# Patient Record
Sex: FEMALE | Race: WHITE | NOT HISPANIC OR LATINO | Employment: FULL TIME | URBAN - METROPOLITAN AREA
[De-identification: names, ages, dates, MRNs, and addresses within clinical notes are randomized per-mention and may not be internally consistent; named-entity substitution may affect disease eponyms.]

---

## 2019-12-26 ENCOUNTER — APPOINTMENT (OUTPATIENT)
Dept: RADIOLOGY | Facility: MEDICAL CENTER | Age: 19
End: 2019-12-26
Attending: EMERGENCY MEDICINE
Payer: COMMERCIAL

## 2019-12-26 ENCOUNTER — HOSPITAL ENCOUNTER (EMERGENCY)
Facility: MEDICAL CENTER | Age: 19
End: 2019-12-26
Attending: EMERGENCY MEDICINE
Payer: COMMERCIAL

## 2019-12-26 VITALS
HEIGHT: 64 IN | BODY MASS INDEX: 18.82 KG/M2 | DIASTOLIC BLOOD PRESSURE: 49 MMHG | TEMPERATURE: 98.2 F | WEIGHT: 110.23 LBS | RESPIRATION RATE: 14 BRPM | HEART RATE: 95 BPM | SYSTOLIC BLOOD PRESSURE: 92 MMHG | OXYGEN SATURATION: 98 %

## 2019-12-26 DIAGNOSIS — N39.0 ACUTE UTI: ICD-10-CM

## 2019-12-26 DIAGNOSIS — O21.0 MORNING SICKNESS: ICD-10-CM

## 2019-12-26 LAB
ALBUMIN SERPL BCP-MCNC: 4.3 G/DL (ref 3.2–4.9)
ALBUMIN/GLOB SERPL: 1.5 G/DL
ALP SERPL-CCNC: 49 U/L (ref 30–99)
ALT SERPL-CCNC: 14 U/L (ref 2–50)
ANION GAP SERPL CALC-SCNC: 11 MMOL/L (ref 0–11.9)
APPEARANCE UR: ABNORMAL
AST SERPL-CCNC: 16 U/L (ref 12–45)
B-HCG SERPL-ACNC: ABNORMAL MIU/ML (ref 0–5)
BACTERIA #/AREA URNS HPF: ABNORMAL /HPF
BASOPHILS # BLD AUTO: 0.3 % (ref 0–1.8)
BASOPHILS # BLD: 0.02 K/UL (ref 0–0.12)
BILIRUB SERPL-MCNC: 0.4 MG/DL (ref 0.1–1.5)
BILIRUB UR QL STRIP.AUTO: NEGATIVE
BUN SERPL-MCNC: 12 MG/DL (ref 8–22)
CALCIUM SERPL-MCNC: 9.5 MG/DL (ref 8.5–10.5)
CHLORIDE SERPL-SCNC: 105 MMOL/L (ref 96–112)
CO2 SERPL-SCNC: 20 MMOL/L (ref 20–33)
COLOR UR: ABNORMAL
CREAT SERPL-MCNC: 0.6 MG/DL (ref 0.5–1.4)
EKG IMPRESSION: NORMAL
EOSINOPHIL # BLD AUTO: 0.01 K/UL (ref 0–0.51)
EOSINOPHIL NFR BLD: 0.1 % (ref 0–6.9)
EPI CELLS #/AREA URNS HPF: ABNORMAL /HPF
ERYTHROCYTE [DISTWIDTH] IN BLOOD BY AUTOMATED COUNT: 38.3 FL (ref 35.9–50)
GLOBULIN SER CALC-MCNC: 2.8 G/DL (ref 1.9–3.5)
GLUCOSE SERPL-MCNC: 79 MG/DL (ref 65–99)
GLUCOSE UR STRIP.AUTO-MCNC: NEGATIVE MG/DL
HCT VFR BLD AUTO: 40.7 % (ref 37–47)
HGB BLD-MCNC: 14.1 G/DL (ref 12–16)
IMM GRANULOCYTES # BLD AUTO: 0.03 K/UL (ref 0–0.11)
IMM GRANULOCYTES NFR BLD AUTO: 0.4 % (ref 0–0.9)
KETONES UR STRIP.AUTO-MCNC: >=160 MG/DL
LEUKOCYTE ESTERASE UR QL STRIP.AUTO: NEGATIVE
LIPASE SERPL-CCNC: 28 U/L (ref 11–82)
LYMPHOCYTES # BLD AUTO: 1.1 K/UL (ref 1–4.8)
LYMPHOCYTES NFR BLD: 15.2 % (ref 22–41)
MCH RBC QN AUTO: 31.2 PG (ref 27–33)
MCHC RBC AUTO-ENTMCNC: 34.6 G/DL (ref 33.6–35)
MCV RBC AUTO: 90 FL (ref 81.4–97.8)
MICRO URNS: ABNORMAL
MONOCYTES # BLD AUTO: 0.37 K/UL (ref 0–0.85)
MONOCYTES NFR BLD AUTO: 5.1 % (ref 0–13.4)
MUCOUS THREADS #/AREA URNS HPF: ABNORMAL /HPF
NEUTROPHILS # BLD AUTO: 5.72 K/UL (ref 2–7.15)
NEUTROPHILS NFR BLD: 78.9 % (ref 44–72)
NITRITE UR QL STRIP.AUTO: NEGATIVE
NRBC # BLD AUTO: 0 K/UL
NRBC BLD-RTO: 0 /100 WBC
PH UR STRIP.AUTO: 5.5 [PH] (ref 5–8)
PLATELET # BLD AUTO: 195 K/UL (ref 164–446)
PMV BLD AUTO: 11.3 FL (ref 9–12.9)
POTASSIUM SERPL-SCNC: 3.5 MMOL/L (ref 3.6–5.5)
PROT SERPL-MCNC: 7.1 G/DL (ref 6–8.2)
PROT UR QL STRIP: NEGATIVE MG/DL
RBC # BLD AUTO: 4.52 M/UL (ref 4.2–5.4)
RBC # URNS HPF: ABNORMAL /HPF
RBC UR QL AUTO: NEGATIVE
SODIUM SERPL-SCNC: 136 MMOL/L (ref 135–145)
SP GR UR STRIP.AUTO: 1.03
UROBILINOGEN UR STRIP.AUTO-MCNC: 1 MG/DL
WBC # BLD AUTO: 7.3 K/UL (ref 4.8–10.8)
WBC #/AREA URNS HPF: ABNORMAL /HPF

## 2019-12-26 PROCEDURE — 83690 ASSAY OF LIPASE: CPT

## 2019-12-26 PROCEDURE — 99284 EMERGENCY DEPT VISIT MOD MDM: CPT

## 2019-12-26 PROCEDURE — 81001 URINALYSIS AUTO W/SCOPE: CPT

## 2019-12-26 PROCEDURE — 84702 CHORIONIC GONADOTROPIN TEST: CPT

## 2019-12-26 PROCEDURE — 76801 OB US < 14 WKS SINGLE FETUS: CPT

## 2019-12-26 PROCEDURE — 93005 ELECTROCARDIOGRAM TRACING: CPT

## 2019-12-26 PROCEDURE — 85025 COMPLETE CBC W/AUTO DIFF WBC: CPT

## 2019-12-26 PROCEDURE — 700105 HCHG RX REV CODE 258: Performed by: EMERGENCY MEDICINE

## 2019-12-26 PROCEDURE — 93005 ELECTROCARDIOGRAM TRACING: CPT | Performed by: EMERGENCY MEDICINE

## 2019-12-26 PROCEDURE — 80053 COMPREHEN METABOLIC PANEL: CPT

## 2019-12-26 RX ORDER — DIPHENHYDRAMINE HYDROCHLORIDE 50 MG/ML
12.5 INJECTION INTRAMUSCULAR; INTRAVENOUS ONCE
Status: DISCONTINUED | OUTPATIENT
Start: 2019-12-26 | End: 2019-12-26 | Stop reason: HOSPADM

## 2019-12-26 RX ORDER — NITROFURANTOIN 25; 75 MG/1; MG/1
100 CAPSULE ORAL 2 TIMES DAILY
Qty: 14 CAP | Refills: 0 | Status: SHIPPED | OUTPATIENT
Start: 2019-12-26 | End: 2020-01-02

## 2019-12-26 RX ORDER — PRENATAL VIT,CAL 76/IRON/FOLIC 29 MG-1 MG
1 TABLET ORAL DAILY
Qty: 30 TAB | Refills: 3 | Status: SHIPPED | OUTPATIENT
Start: 2019-12-26

## 2019-12-26 RX ORDER — METOCLOPRAMIDE HYDROCHLORIDE 5 MG/ML
10 INJECTION INTRAMUSCULAR; INTRAVENOUS ONCE
Status: DISCONTINUED | OUTPATIENT
Start: 2019-12-26 | End: 2019-12-26 | Stop reason: HOSPADM

## 2019-12-26 RX ORDER — SODIUM CHLORIDE 9 MG/ML
2000 INJECTION, SOLUTION INTRAVENOUS ONCE
Status: COMPLETED | OUTPATIENT
Start: 2019-12-26 | End: 2019-12-26

## 2019-12-26 RX ORDER — ONDANSETRON 2 MG/ML
4 INJECTION INTRAMUSCULAR; INTRAVENOUS ONCE
Status: DISCONTINUED | OUTPATIENT
Start: 2019-12-26 | End: 2019-12-26 | Stop reason: HOSPADM

## 2019-12-26 RX ORDER — PYRIDOXINE HCL (VITAMIN B6) 50 MG
25 TABLET ORAL EVERY 8 HOURS PRN
Qty: 15 TAB | Refills: 0 | Status: SHIPPED | OUTPATIENT
Start: 2019-12-26

## 2019-12-26 RX ADMIN — SODIUM CHLORIDE 2000 ML: 9 INJECTION, SOLUTION INTRAVENOUS at 16:09

## 2019-12-26 SDOH — HEALTH STABILITY: MENTAL HEALTH: HOW OFTEN DO YOU HAVE A DRINK CONTAINING ALCOHOL?: NEVER

## 2019-12-26 NOTE — ED NOTES
Pt ambulatory to [facility].  Agree with triage assessment.  Pt changed into gown.  Chart up for ERP.

## 2019-12-26 NOTE — ED PROVIDER NOTES
"ED Provider Note    Scribed for Rosalino Menezes M.D. by Germania Lew. 12/26/2019,  3:49 PM.    CHIEF COMPLAINT  Chief Complaint   Patient presents with   • N/V   • Morning Sickness   • Shortness of Breath       HPI  Marah Robin is a 19 y.o. 1st trimester pregnant female who presents to the Emergency Department for complaints of intermittent and persistent vomiting onset two weeks ago. She reports associated nausea, shortness of breath, and abdominal cramping. She describes cramping as located in the left medial region of the abdomen. Denies fever, chills, vaginal bleeding, or abnormal vaginal discharge. She denies any exacerbating or alleviating factors and believes emesis is secondary to 1st trimester pregnancy. She states that she has a scheduled appointment with OBGYN at this time. Denies past medical history.     REVIEW OF SYSTEMS  See HPI for further details. All other systems are negative.     PAST MEDICAL HISTORY   has a past medical history of Asthma.    SOCIAL HISTORY  Social History     Tobacco Use   • Smoking status: Never Smoker   • Smokeless tobacco: Never Used   Substance and Sexual Activity   • Alcohol use: Never     Frequency: Never   • Drug use: Never   • Sexual activity: Not on file     Social History     Substance and Sexual Activity   Drug Use Never       SURGICAL HISTORY  patient denies any surgical history    CURRENT MEDICATIONS  Home Medications     Reviewed by Puja Newman R.N. (Registered Nurse) on 12/26/19 at 1332  Med List Status: Complete   Medication Last Dose Status        Patient Dez Taking any Medications                       ALLERGIES  Allergies   Allergen Reactions   • Augmentin      rash   • Bactrim [Sulfamethoxazole W-Trimethoprim]      \"throat closes\"    • Sulfa Drugs      \"Throat closes\"       PHYSICAL EXAM  VITAL SIGNS: /66   Pulse (!) 128   Temp 36.8 °C (98.2 °F) (Temporal)   Resp 14   Ht 1.626 m (5' 4\")   Wt 50 kg (110 lb 3.7 oz)   LMP 10/31/2019  "  SpO2 94%   BMI 18.92 kg/m²   Pulse ox interpretation: I interpret this pulse ox as normal.  Constitutional: Alert in no apparent distress.  HENT: No signs of trauma, Bilateral external ears normal, Nose normal.   Eyes: Conjunctiva normal, Non-icteric.   Neck: Normal range of motion, Supple, No stridor.   Lymphatic: No lymphadenopathy noted.   Cardiovascular: Regular tachycardic rate and rhythm, no murmurs.   Thorax & Lungs: Normal breath sounds, No respiratory distress, No wheezing, No chest tenderness.   Abdomen: Bowel sounds normal, Soft, No tenderness, No masses, No pulsatile masses. No peritoneal signs.  Skin: Dry cracked skin. Warm, No erythema, No rash.   Back: No midline bony tenderness.  Extremities: Intact distal pulses, No edema, No cyanosis.  Musculoskeletal: Good range of motion in all major joints. No or major deformities noted.   Neurologic: Alert , Normal motor function, Normal sensory function, No focal deficits noted.   Psychiatric: Affect normal, Judgment normal, Mood normal.     DIAGNOSTIC STUDIES / PROCEDURES      LABS  Labs Reviewed   CBC WITH DIFFERENTIAL - Abnormal; Notable for the following components:       Result Value    Neutrophils-Polys 78.90 (*)     Lymphocytes 15.20 (*)     All other components within normal limits   COMP METABOLIC PANEL - Abnormal; Notable for the following components:    Potassium 3.5 (*)     All other components within normal limits   URINALYSIS,CULTURE IF INDICATED - Abnormal; Notable for the following components:    Character Cloudy (*)     All other components within normal limits   URINE MICROSCOPIC (W/UA) - Abnormal; Notable for the following components:    WBC 5-10 (*)     Bacteria Moderate (*)     Epithelial Cells Moderate (*)     All other components within normal limits   LIPASE   HCG QUANTITATIVE   ESTIMATED GFR     All labs reviewed by me.    RADIOLOGY  US-OB 1ST TRIMESTER WITH TRANSVAGINAL (COMBO)   Final Result      Viable single intrauterine  gestation of an estimated gestational age of 7 weeks.        The radiologist's interpretation of all radiological studies have been reviewed by me.    COURSE & MEDICAL DECISION MAKING  Nursing notes, VS, PMSFHx reviewed in chart.     3:49 PM Patient seen and examined at bedside. Differential diagnosis includes but is not limited to morning sickness, dehydration, electrolyte abnormalities. Less likely infection, due to no fever, chills, vaginal bleeding, or abnormal vaginal discharge. Ordered for US-OB 1st trimester, CBC with differential, CMP, lipase, HCG Qual, UA, and EKG to evaluate. Patient will be treated with NS infusion 2,000 mL, Reglan 10 mg, and Benadryl 12.5 mg for her symptoms.     5:10 PM - Patient was reevaluated at bedside. Discussed lab and radiology results with the patient and informed them of acute UTI and likely morning sickness. Informed them of plan for discharge with Prenatabs, Vitamin B-6, Unisom, and Macrobid. They understand and agree to plan.  She has improved with IV fluids, and has not had no vomiting here.     HYDRATION: Based on the patient's presentation of Acute Vomiting and Tachycardia the patient was given IV fluids. IV Hydration was used because oral hydration was not as rapid as required. Upon recheck following hydration, the patient was improved, with resolved tachycardia.     The patient will return for new or worsening symptoms and is stable at the time of discharge.    The patient is referred to a primary physician for blood pressure management, diabetic screening, and for all other preventative health concerns.    DISPOSITION:  Patient will be discharged home in stable condition.    FOLLOW UP:  [facility], Emergency Dept  [address] [address] [phone]    If symptoms worsen      OUTPATIENT MEDICATIONS:  New Prescriptions    DOXYLAMINE (UNISOM) 25 MG TAB TABLET    Take 0.5 Tabs by mouth every 8 hours as needed (nausea).     NITROFURANTOIN MONOHYD MACRO (MACROBID) 100 MG CAP    Take 1 Cap by mouth 2 times a day for 7 days.    PRENATAL VIT-IRON CARBONYL-FA (PRENATABS RX) 29-1 MG TAB    Take 1 Tab by mouth every day.    PYRIDOXINE (VITAMIN B-6) 50 MG TAB    Take 0.5 Tabs by mouth every 8 hours as needed (nauesa).        FINAL IMPRESSION  1. Morning sickness    2. Acute UTI         Germania CONCEPCION (Scribe), am scribing for, and in the presence of, Rosalino Menezes M.D..    Electronically signed by: Germania Lew (Scribe), 12/26/2019    IRosalino M.D. personally performed the services described in this documentation, as scribed by Germania Lew in my presence, and it is both accurate and complete. C    The note accurately reflects work and decisions made by me.  Rosalino Menezes  12/26/2019  6:28 PM

## 2019-12-26 NOTE — ED TRIAGE NOTES
Chief Complaint   Patient presents with   • N/V   • Morning Sickness   • Shortness of Breath       Patient ambulatory to triage with above complaint. States that she has been unable to eat for the last 2 weeks and has lost 10 lbs. Reports that she is 8-9 weeks pregnant but has not been able to get into an OBGYN. Patient looks weak in triage and is tachycardic. EKG ordered.  Patient placed out in lobby and educated on triage process.

## 2019-12-27 NOTE — DISCHARGE INSTRUCTIONS
Your labs are reassuring, except for a urinary tract infection.  These are especially important to treat in pregnancy.  Fill the Macrobid antibiotic prescription that we provided, and take all pills as prescribed, even if you feel better first.     For nausea, take half a tablet of doxylamine with half a tablet of pyridoxine every 8 hours as needed for nausea.    We have also prescribed you a prenatal vitamin, which you should start taking now, and take daily.

## 2020-01-07 ENCOUNTER — INITIAL PRENATAL (OUTPATIENT)
Dept: OBGYN | Facility: CLINIC | Age: 20
End: 2020-01-07
Payer: COMMERCIAL

## 2020-01-07 ENCOUNTER — HOSPITAL ENCOUNTER (OUTPATIENT)
Facility: MEDICAL CENTER | Age: 20
End: 2020-01-07
Attending: OBSTETRICS & GYNECOLOGY
Payer: COMMERCIAL

## 2020-01-07 VITALS — DIASTOLIC BLOOD PRESSURE: 67 MMHG | WEIGHT: 112 LBS | SYSTOLIC BLOOD PRESSURE: 109 MMHG | BODY MASS INDEX: 19.22 KG/M2

## 2020-01-07 DIAGNOSIS — Z34.90 UNPLANNED PREGNANCY: ICD-10-CM

## 2020-01-07 DIAGNOSIS — Z30.09 ENCOUNTER FOR COUNSELING REGARDING CONTRACEPTION: ICD-10-CM

## 2020-01-07 DIAGNOSIS — Z11.3 ROUTINE SCREENING FOR STI (SEXUALLY TRANSMITTED INFECTION): ICD-10-CM

## 2020-01-07 PROCEDURE — 99203 OFFICE O/P NEW LOW 30 MIN: CPT | Performed by: OBSTETRICS & GYNECOLOGY

## 2020-01-07 PROCEDURE — 87491 CHLMYD TRACH DNA AMP PROBE: CPT

## 2020-01-07 PROCEDURE — 87591 N.GONORRHOEAE DNA AMP PROB: CPT

## 2020-01-07 RX ORDER — ALBUTEROL SULFATE 90 UG/1
2 AEROSOL, METERED RESPIRATORY (INHALATION) EVERY 6 HOURS PRN
COMMUNITY

## 2020-01-07 RX ORDER — ONDANSETRON 4 MG/1
4 TABLET, FILM COATED ORAL EVERY 4 HOURS PRN
COMMUNITY

## 2020-01-07 NOTE — PROGRESS NOTES
Pt. Here for NOB visit today.  # [phone]  First prenatal care  Pt. States no concerns   Pharmacy verified

## 2020-01-07 NOTE — PROGRESS NOTES
Gyn Visit    Cc: Discuss pregnancy and contraception    HPI:  The patient is a 19 y.o.  @ 8w5d by 6wk US here to discuss pregnancy and contraceptive options.  Patient reports she was not trying to get pregnant and is strongly considering pregnancy termination.  [facility] [place] [facility].  Mostly wanted to be seen here to discuss termination and whether or to have a lasting impact on her future fertility.  Was on progestin only pill at the time of pregnancy.  Has a history of migraine with aura so cannot take estrogen.    Was seen in ED for N/V and had US as below 19.  AME >5d different from LMP dating.  Was given Zofran which she is taking approximately 2 times per day and helps some with her nausea and vomiting.  Is able to tolerate some solids and liquids daily.  Not taking B6 and Unisom as prescribed.  Denies vaginal bleeding, no other concerns today.    ROS:  gen: denies general concerns  CV/resp: denies history of cardiac/respiratory issues.  abd: reports nausea/vomiting.  Denies abd pain.  GYN:denies vaginal bleeding, concerns        OB History    Para Term  AB Living   1             SAB TAB Ectopic Molar Multiple Live Births                    # Outcome Date GA Lbr Rojelio/2nd Weight Sex Delivery Anes PTL Lv   1 Current                GYNHx:  Hx of CT age 17  Hx of irregular menses on progestin only pill for this since teenage years.  No paps    Past Medical History:   Diagnosis Date   • Asthma    • Migraine headache with aura      Past Surgical History:   Procedure Laterality Date   • KNEE RECONSTRUCTION       Allergies:   Allergies as of 2020 - Reviewed 2020   Allergen Reaction Noted   • Azithromycin Rash 10/16/2014   • Sulfamethoxazole-trimethoprim Vomiting and Rash 10/24/2017   • Augmentin  2019   • Bactrim [sulfamethoxazole w-trimethoprim]  2019   • Amoxicillin-pot clavulanate Rash 10/19/2017   • Sulfa drugs Rash  2016       Family History   Problem Relation Age of Onset   • Hypertension Mother    • Heart Disease Father    • Cancer Brother      Leukemia in brother  Melanoma in PGM, no one else.    Social History     Tobacco Use   • Smoking status: Never Smoker   • Smokeless tobacco: Never Used   Substance Use Topics   • Alcohol use: Never     Frequency: Never   • Drug use: Never       Current Outpatient Medications on File Prior to Visit   Medication Sig Dispense Refill   • ondansetron (ZOFRAN) 4 MG Tab tablet Take 4 mg by mouth every four hours as needed for Nausea/Vomiting.     • albuterol 108 (90 Base) MCG/ACT Aero Soln inhalation aerosol Inhale 2 Puffs by mouth every 6 hours as needed.     • doxylamine (UNISOM) 25 MG Tab tablet Take 0.5 Tabs by mouth every 8 hours as needed (nausea). 15 Tab 0   • pyridoxine (VITAMIN B-6) 50 MG Tab Take 0.5 Tabs by mouth every 8 hours as needed (nauesa). 15 Tab 0   • Prenatal Vit-Iron Carbonyl-FA (PRENATABS RX) 29-1 MG Tab Take 1 Tab by mouth every day. 30 Tab 3     No current facility-administered medications on file prior to visit.          PE:    /67   Wt 50.8 kg (112 lb)   Gen: AAO, NAD  CV: RRR  Resp: ctab  Abd: soft, NT, ND   Bedside TUAS: SIUP w/ +CRL, +FHTs 160s  : NEFG  Ext: NT, no edema    A/P:  19 y.o.  8w5d by 6wk US here for discussion of pregnancy options and contraception   1. Unplanned pregnancy     2. Encounter for counseling regarding contraception  REFERRAL TO GYNECOLOGY   3. Routine screening for STI (sexually transmitted infection)  Chlamydia/GC PCR Urine Or Swab     -Discussed unplanned pregnancy with patient and what her thoughts and desires are at this time.  Patient reports she is planning on terminating the pregnancy, just wanted to make sure that this would likely have no effect on her future childbearing abilities that she does not children in the future.  [facility] in [place] [facility].   We discussed D&C and that it is rare to have lasting effects from TAB via D&C.  Discussed rare surgical complications do happen, including risk of scarring inside the uterus.  Discussed that risk of early D&C is significantly less than the risk of pregnancy in general.  Offered support for pt in making her decision and doing whatever she feels is right for her.  Discussed that I do not have the equipment available to perform TAB in the office and in hospital TAB would be significantly more expensive without need for higher level of care or improved outcomes.    - discussed contraceptive options.  Patient is not a candidate for estrogen therapy secondary to history of migraine with aura.  Given that she got pregnant on the progestin only pill and strongly desires to avoid additional unplanned pregnancy, recommend LARC.  Discussed IUD options as well as Nexplanon.  Patient reports her periods are heavy and crampy, recommend levonorgestrel IUD.  Patient is concerned about cramping that can be had with the device, to maximize longevity of use discussed Mirena versus Kyleena.  Discussed there is not a head-to-head trial comparing different IUD size in women in particular childbearing stages of their life, however she is concerned about IUD size recommend Kyleena.  Patient desires Kyleena to be placed as soon as possible, discussed ideally her pregnancy test will be negative prior to placement which can take several weeks post D&C.  Discussed importance of avoiding unprotected intercourse prior to IUD placement.  Patient reports she will abstain until her IUD can be placed postprocedure.  Kyleena ordered today, to follow-up in approximately 4 to 6 weeks for placement, approximately 1 month status post D&C.  Discussed that if her pregnancy discussed a 0 earlier and she is desiring earlier placement may contact me and we will try to get in and soon as possible when her IUD is available.    -If patient changes her mind about  termination, encouraged to return to initiate for routine OB care as we would be happy to take care of her through her pregnancy if that is what she desires.     F/U 4wks    Patient was seen for 30 minutes of which > 50% of appointment time was spent on face-to-face counseling and coordination of care regarding the above.      Germania Malik MD  [facility], Women's Health

## 2020-01-08 LAB
C TRACH DNA SPEC QL NAA+PROBE: NEGATIVE
N GONORRHOEA DNA SPEC QL NAA+PROBE: NEGATIVE
SPECIMEN SOURCE: NORMAL

## 2020-01-27 ENCOUNTER — TELEPHONE (OUTPATIENT)
Dept: SCHEDULING | Facility: IMAGING CENTER | Age: 20
End: 2020-01-27

## 2020-02-12 ENCOUNTER — APPOINTMENT (OUTPATIENT)
Dept: MEDICAL GROUP | Facility: MEDICAL CENTER | Age: 20
End: 2020-02-12
Payer: COMMERCIAL

## 2020-02-19 ENCOUNTER — GYNECOLOGY VISIT (OUTPATIENT)
Dept: OBGYN | Facility: CLINIC | Age: 20
End: 2020-02-19
Payer: COMMERCIAL

## 2020-02-19 VITALS — SYSTOLIC BLOOD PRESSURE: 108 MMHG | WEIGHT: 115 LBS | DIASTOLIC BLOOD PRESSURE: 61 MMHG | BODY MASS INDEX: 19.74 KG/M2

## 2020-02-19 DIAGNOSIS — Z30.430 ENCOUNTER FOR IUD INSERTION: ICD-10-CM

## 2020-02-19 LAB
INT CON NEG: NEGATIVE
INT CON POS: POSITIVE
POC URINE PREGNANCY TEST: NEGATIVE

## 2020-02-19 PROCEDURE — 81025 URINE PREGNANCY TEST: CPT | Performed by: OBSTETRICS & GYNECOLOGY

## 2020-02-19 PROCEDURE — 58300 INSERT INTRAUTERINE DEVICE: CPT | Performed by: OBSTETRICS & GYNECOLOGY

## 2020-02-19 NOTE — NON-PROVIDER
Pt here for Kyleena Insert  Pt states no concerns   Good #[phone]   Pharmacy verified.  Preg test:neg

## 2020-02-19 NOTE — PROCEDURES
IUD Insertion  Date/Time: 2020 3:07 PM  Performed by: Germania Malik M.D.  Authorized by: Germania Malik M.D.         CC: desires IUD insertion    HPI:  19 y.o.  presents today for desired IUD insertion.  Pt today desires kyleena IUD.    Patient's last menstrual period was 2019.  Current contraception: abstinence; s/p recent termination, just had first period since.    UPT neg    /61   Wt 52.2 kg (115 lb)   LMP 2019   Breastfeeding No   BMI 19.74 kg/m²   IUD Insertion:  The bimanual exam is performed the uterus is noted to be 6 weeks in size and is anteverted  A speculum was inserted into the vagina, the cervix was cleansed with Betadine swabs x3  Tenaculum was placed on the anterior lip of the cervix  The IUD was loaded into   The uterus was sounded to a depth of 7cm  The IUD was released into the uterus.    The strings trimmed to approximately 2 cm  Tenaculum was removed from the cervix and hemostasis was noted.  The patient tolerated the procedure well    Lot: [id number]  Exp: 2021    A/P: 19 y.o.  s/p kyleena IUD insertion as above  - prior to insertion, risks of IUD reviewed with pt including risk of insertion including pain, bleeding, infection, uterine perforation (approximately 1.1000) and possible need for additional surgical procedure for removal, as well as risks of IUD including pregnancy/contraception failure,  expulsion.  Also discussed likely changes to menstrual cycle with kyleena IUD including decreased, irregular or absent menses.   All questions answered, consent signed.    - reviewed need for backup contraception for intercourse within 7 days    Plan for f/u as needed for IUD check in 1mos, earlier if concerns.  If after 20st Birthday will do pap with IUD check.      Germania Malik MD  [facility], Women's Health

## 2020-02-27 ENCOUNTER — TELEPHONE (OUTPATIENT)
Dept: SCHEDULING | Facility: IMAGING CENTER | Age: 20
End: 2020-02-27

## 2020-03-26 ENCOUNTER — TELEPHONE (OUTPATIENT)
Dept: HEALTH INFORMATION MANAGEMENT | Facility: OTHER | Age: 20
End: 2020-03-26

## 2020-03-27 ENCOUNTER — APPOINTMENT (OUTPATIENT)
Dept: MEDICAL GROUP | Facility: MEDICAL CENTER | Age: 20
End: 2020-03-27
Payer: COMMERCIAL

## 2020-04-17 ENCOUNTER — GYNECOLOGY VISIT (OUTPATIENT)
Dept: OBGYN | Facility: CLINIC | Age: 20
End: 2020-04-17
Payer: COMMERCIAL

## 2020-04-17 ENCOUNTER — HOSPITAL ENCOUNTER (OUTPATIENT)
Facility: MEDICAL CENTER | Age: 20
End: 2020-04-17
Attending: OBSTETRICS & GYNECOLOGY
Payer: COMMERCIAL

## 2020-04-17 VITALS — SYSTOLIC BLOOD PRESSURE: 93 MMHG | DIASTOLIC BLOOD PRESSURE: 63 MMHG | BODY MASS INDEX: 19.57 KG/M2 | WEIGHT: 114 LBS

## 2020-04-17 DIAGNOSIS — Z12.4 CERVICAL CANCER SCREENING: ICD-10-CM

## 2020-04-17 DIAGNOSIS — R10.2 PELVIC PAIN: ICD-10-CM

## 2020-04-17 PROCEDURE — 88175 CYTOPATH C/V AUTO FLUID REDO: CPT

## 2020-04-17 PROCEDURE — 99213 OFFICE O/P EST LOW 20 MIN: CPT | Performed by: OBSTETRICS & GYNECOLOGY

## 2020-04-17 ASSESSMENT — FIBROSIS 4 INDEX: FIB4 SCORE: 0.46

## 2020-04-17 NOTE — PROGRESS NOTES
GYN Visit    CC: IUD check    HPI:  20 y.o.  s/p kyleena IUD insertion on 2020 with me for contraception.  Reports feeling well overall.  Has some occasional cramp/sharp pains, some light spotting still.  Also reports her anxiety recently has increased although getting bloodwork through PCP and plans to f/u with them to ensure no other etiology.  Overall would like to cont with IUD at this time.      ROS:  GYN: denies ongoing heavy vaginal bleeding, mild pain as above    Physical exam:  BP (!) 93/63   Wt 51.7 kg (114 lb)   Gen: conversant, NAD  : NEFG, normal vagina, urthethral meatus normal in appearance, cervix normal with +strings present; small old blood    A/P: 20 y.o.  with kyleena IUD in place  - IUD appears in place; given pelvic pain at times rx given for pelvic US to better evaluate appropriate position of IUD in endometrial cavity and also evaluate adnexa.  Declines rx ibuprofen today.  - first pap today for routine cervical cancer screening    - discussed mood changes possible with hormonal contraception although systemic absorption is low with IUD.  Encouraged to cont w/u and eval with PCP.  Pt at this time would rather cont with IUD and consider antidepressant/anxiety medications as needed    1. Cervical cancer screening  THINPREP PAP,REFLEX HPV ON ASC-US ONLY   2. Pelvic pain  US-PELVIC COMPLETE (TRANSABDOMINAL/TRANSVAGINAL) (COMBO)       RTC prn or annually for well woman exam    Germania Malik MD  [facility], Women's Health

## 2020-04-18 LAB — CYTOLOGY REG CYTOL: NORMAL
